# Patient Record
Sex: FEMALE | Race: WHITE | NOT HISPANIC OR LATINO | Employment: UNEMPLOYED | URBAN - METROPOLITAN AREA
[De-identification: names, ages, dates, MRNs, and addresses within clinical notes are randomized per-mention and may not be internally consistent; named-entity substitution may affect disease eponyms.]

---

## 2019-05-20 LAB — NONINV COLON CA DNA+OCC BLD SCRN STL QL: NORMAL

## 2019-05-24 LAB — HCV AB SER-ACNC: NEGATIVE

## 2020-07-06 LAB
CHOLEST SERPL-MSCNC: 209 MG/DL (ref 0–200)
HDLC SERPL-MCNC: 54 MG/DL
LDLC SERPL CALC-MCNC: 135 MG/DL (ref 0–160)
TRIGLYCERIDE (LIPID PAN): 102

## 2020-11-18 ENCOUNTER — HOSPITAL ENCOUNTER (OUTPATIENT)
Dept: RADIOLOGY | Facility: HOSPITAL | Age: 65
Discharge: HOME | End: 2020-11-18
Attending: FAMILY MEDICINE
Payer: MEDICARE

## 2020-11-18 DIAGNOSIS — R29.90 UNSPECIFIED SYMPTOMS AND SIGNS INVOLVING THE NERVOUS SYSTEM: ICD-10-CM

## 2020-11-18 PROCEDURE — 70553 MRI BRAIN W WO CONTRAST: ICD-10-PCS | Mod: 26,,, | Performed by: RADIOLOGY

## 2020-11-18 PROCEDURE — 70553 MRI BRAIN STEM W/O & W/DYE: CPT | Mod: 26,,, | Performed by: RADIOLOGY

## 2020-11-18 PROCEDURE — 70553 MRI BRAIN STEM W/O & W/DYE: CPT | Mod: TC

## 2020-11-18 PROCEDURE — 25500020 PHARM REV CODE 255: Performed by: FAMILY MEDICINE

## 2020-11-18 PROCEDURE — A9585 GADOBUTROL INJECTION: HCPCS | Performed by: FAMILY MEDICINE

## 2020-11-18 RX ORDER — GADOBUTROL 604.72 MG/ML
7 INJECTION INTRAVENOUS
Status: COMPLETED | OUTPATIENT
Start: 2020-11-18 | End: 2020-11-18

## 2020-11-18 RX ADMIN — GADOBUTROL 7 ML: 604.72 INJECTION INTRAVENOUS at 04:11

## 2021-02-09 ENCOUNTER — TELEPHONE (OUTPATIENT)
Dept: FAMILY MEDICINE | Facility: CLINIC | Age: 66
End: 2021-02-09

## 2021-02-20 ENCOUNTER — HOSPITAL ENCOUNTER (OUTPATIENT)
Dept: RADIOLOGY | Facility: HOSPITAL | Age: 66
Discharge: HOME | End: 2021-02-20
Attending: NEUROLOGICAL SURGERY
Payer: MEDICARE

## 2021-02-20 DIAGNOSIS — D33.2 BENIGN NEOPLASM OF BRAIN, UNSPECIFIED: ICD-10-CM

## 2021-02-20 LAB
CREAT SERPL-MCNC: 0.6 MG/DL (ref 0.5–1.4)
SAMPLE: NORMAL

## 2021-02-20 PROCEDURE — A9585 GADOBUTROL INJECTION: HCPCS | Performed by: NEUROLOGICAL SURGERY

## 2021-02-20 PROCEDURE — 70553 MRI BRAIN W WO CONTRAST: ICD-10-PCS | Mod: 26,,, | Performed by: RADIOLOGY

## 2021-02-20 PROCEDURE — 70553 MRI BRAIN STEM W/O & W/DYE: CPT | Mod: 26,,, | Performed by: RADIOLOGY

## 2021-02-20 PROCEDURE — 70553 MRI BRAIN STEM W/O & W/DYE: CPT | Mod: TC

## 2021-02-20 PROCEDURE — 25500020 PHARM REV CODE 255: Performed by: NEUROLOGICAL SURGERY

## 2021-02-20 RX ORDER — GADOBUTROL 604.72 MG/ML
7 INJECTION INTRAVENOUS
Status: COMPLETED | OUTPATIENT
Start: 2021-02-20 | End: 2021-02-20

## 2021-02-20 RX ADMIN — GADOBUTROL 7 ML: 604.72 INJECTION INTRAVENOUS at 10:02

## 2021-04-29 ENCOUNTER — OFFICE VISIT (OUTPATIENT)
Dept: FAMILY MEDICINE | Facility: CLINIC | Age: 66
End: 2021-04-29
Payer: MEDICARE

## 2021-04-29 ENCOUNTER — LAB VISIT (OUTPATIENT)
Dept: LAB | Facility: CLINIC | Age: 66
End: 2021-04-29
Payer: MEDICARE

## 2021-04-29 VITALS
BODY MASS INDEX: 30.33 KG/M2 | WEIGHT: 164.81 LBS | DIASTOLIC BLOOD PRESSURE: 80 MMHG | HEIGHT: 62 IN | OXYGEN SATURATION: 97 % | HEART RATE: 73 BPM | SYSTOLIC BLOOD PRESSURE: 120 MMHG | TEMPERATURE: 98 F

## 2021-04-29 DIAGNOSIS — I47.10 SUPRAVENTRICULAR TACHYCARDIA: ICD-10-CM

## 2021-04-29 DIAGNOSIS — G20.A1 PARKINSON DISEASE: Primary | ICD-10-CM

## 2021-04-29 DIAGNOSIS — G20.A1 PARKINSON DISEASE: ICD-10-CM

## 2021-04-29 DIAGNOSIS — R53.83 LETHARGY: ICD-10-CM

## 2021-04-29 DIAGNOSIS — C44.319 BASAL CELL CARCINOMA (BCC) OF SKIN OF OTHER PART OF FACE: ICD-10-CM

## 2021-04-29 LAB
ALBUMIN SERPL BCP-MCNC: 3.8 G/DL (ref 3.5–5.2)
ALP SERPL-CCNC: 90 U/L (ref 55–135)
ALT SERPL W/O P-5'-P-CCNC: <5 U/L (ref 10–44)
ANION GAP SERPL CALC-SCNC: 8 MMOL/L (ref 8–16)
AST SERPL-CCNC: 13 U/L (ref 10–40)
BASOPHILS # BLD AUTO: 0.04 K/UL (ref 0–0.2)
BASOPHILS NFR BLD: 0.5 % (ref 0–1.9)
BILIRUB SERPL-MCNC: 0.4 MG/DL (ref 0.1–1)
BUN SERPL-MCNC: 16 MG/DL (ref 8–23)
CALCIUM SERPL-MCNC: 9.2 MG/DL (ref 8.7–10.5)
CHLORIDE SERPL-SCNC: 103 MMOL/L (ref 95–110)
CO2 SERPL-SCNC: 29 MMOL/L (ref 23–29)
CREAT SERPL-MCNC: 0.8 MG/DL (ref 0.5–1.4)
DIFFERENTIAL METHOD: ABNORMAL
EOSINOPHIL # BLD AUTO: 0.1 K/UL (ref 0–0.5)
EOSINOPHIL NFR BLD: 0.6 % (ref 0–8)
ERYTHROCYTE [DISTWIDTH] IN BLOOD BY AUTOMATED COUNT: 12 % (ref 11.5–14.5)
EST. GFR  (AFRICAN AMERICAN): >60 ML/MIN/1.73 M^2
EST. GFR  (NON AFRICAN AMERICAN): >60 ML/MIN/1.73 M^2
GLUCOSE SERPL-MCNC: 99 MG/DL (ref 70–110)
HCT VFR BLD AUTO: 40.6 % (ref 37–48.5)
HGB BLD-MCNC: 13.2 G/DL (ref 12–16)
IMM GRANULOCYTES # BLD AUTO: 0.02 K/UL (ref 0–0.04)
IMM GRANULOCYTES NFR BLD AUTO: 0.3 % (ref 0–0.5)
LYMPHOCYTES # BLD AUTO: 2.2 K/UL (ref 1–4.8)
LYMPHOCYTES NFR BLD: 28 % (ref 18–48)
MCH RBC QN AUTO: 31.4 PG (ref 27–31)
MCHC RBC AUTO-ENTMCNC: 32.5 G/DL (ref 32–36)
MCV RBC AUTO: 96 FL (ref 82–98)
MONOCYTES # BLD AUTO: 0.5 K/UL (ref 0.3–1)
MONOCYTES NFR BLD: 6.4 % (ref 4–15)
NEUTROPHILS # BLD AUTO: 5 K/UL (ref 1.8–7.7)
NEUTROPHILS NFR BLD: 64.2 % (ref 38–73)
NRBC BLD-RTO: 0 /100 WBC
PLATELET # BLD AUTO: 299 K/UL (ref 150–450)
PMV BLD AUTO: 10.7 FL (ref 9.2–12.9)
POTASSIUM SERPL-SCNC: 4.7 MMOL/L (ref 3.5–5.1)
PROT SERPL-MCNC: 7.2 G/DL (ref 6–8.4)
RBC # BLD AUTO: 4.21 M/UL (ref 4–5.4)
SODIUM SERPL-SCNC: 140 MMOL/L (ref 136–145)
TSH SERPL DL<=0.005 MIU/L-ACNC: 1.12 UIU/ML (ref 0.4–4)
WBC # BLD AUTO: 7.83 K/UL (ref 3.9–12.7)

## 2021-04-29 PROCEDURE — 36415 COLL VENOUS BLD VENIPUNCTURE: CPT | Mod: ,,, | Performed by: FAMILY MEDICINE

## 2021-04-29 PROCEDURE — 85025 COMPLETE CBC W/AUTO DIFF WBC: CPT | Performed by: FAMILY MEDICINE

## 2021-04-29 PROCEDURE — 99213 OFFICE O/P EST LOW 20 MIN: CPT | Mod: S$GLB,,, | Performed by: FAMILY MEDICINE

## 2021-04-29 PROCEDURE — 3008F PR BODY MASS INDEX (BMI) DOCUMENTED: ICD-10-PCS | Mod: CPTII,S$GLB,, | Performed by: FAMILY MEDICINE

## 2021-04-29 PROCEDURE — 36415 PR COLLECTION VENOUS BLOOD,VENIPUNCTURE: ICD-10-PCS | Mod: ,,, | Performed by: FAMILY MEDICINE

## 2021-04-29 PROCEDURE — 3288F PR FALLS RISK ASSESSMENT DOCUMENTED: ICD-10-PCS | Mod: CPTII,S$GLB,, | Performed by: FAMILY MEDICINE

## 2021-04-29 PROCEDURE — 84443 ASSAY THYROID STIM HORMONE: CPT | Performed by: FAMILY MEDICINE

## 2021-04-29 PROCEDURE — 3288F FALL RISK ASSESSMENT DOCD: CPT | Mod: CPTII,S$GLB,, | Performed by: FAMILY MEDICINE

## 2021-04-29 PROCEDURE — 80053 COMPREHEN METABOLIC PANEL: CPT | Performed by: FAMILY MEDICINE

## 2021-04-29 PROCEDURE — 1159F MED LIST DOCD IN RCRD: CPT | Mod: S$GLB,,, | Performed by: FAMILY MEDICINE

## 2021-04-29 PROCEDURE — 1159F PR MEDICATION LIST DOCUMENTED IN MEDICAL RECORD: ICD-10-PCS | Mod: S$GLB,,, | Performed by: FAMILY MEDICINE

## 2021-04-29 PROCEDURE — 1101F PR PT FALLS ASSESS DOC 0-1 FALLS W/OUT INJ PAST YR: ICD-10-PCS | Mod: CPTII,S$GLB,, | Performed by: FAMILY MEDICINE

## 2021-04-29 PROCEDURE — 1101F PT FALLS ASSESS-DOCD LE1/YR: CPT | Mod: CPTII,S$GLB,, | Performed by: FAMILY MEDICINE

## 2021-04-29 PROCEDURE — 99213 PR OFFICE/OUTPT VISIT, EST, LEVL III, 20-29 MIN: ICD-10-PCS | Mod: S$GLB,,, | Performed by: FAMILY MEDICINE

## 2021-04-29 PROCEDURE — 3008F BODY MASS INDEX DOCD: CPT | Mod: CPTII,S$GLB,, | Performed by: FAMILY MEDICINE

## 2021-04-29 RX ORDER — IMIQUIMOD 12.5 MG/.25G
CREAM TOPICAL
Qty: 12 PACKET | Refills: 0 | Status: SHIPPED | OUTPATIENT
Start: 2021-04-29 | End: 2022-03-31

## 2021-04-29 RX ORDER — ACETAMINOPHEN, DIPHENHYDRAMINE HCL, PHENYLEPHRINE HCL 325; 25; 5 MG/1; MG/1; MG/1
TABLET ORAL
COMMUNITY

## 2021-04-29 RX ORDER — CARBIDOPA AND LEVODOPA 25; 100 MG/1; MG/1
TABLET ORAL
COMMUNITY
Start: 2021-03-25 | End: 2022-01-04

## 2021-05-05 DIAGNOSIS — Z12.11 COLON CANCER SCREENING: ICD-10-CM

## 2021-10-07 ENCOUNTER — PATIENT MESSAGE (OUTPATIENT)
Dept: ADMINISTRATIVE | Facility: HOSPITAL | Age: 66
End: 2021-10-07

## 2021-10-08 ENCOUNTER — CLINICAL SUPPORT (OUTPATIENT)
Dept: FAMILY MEDICINE | Facility: CLINIC | Age: 66
End: 2021-10-08
Payer: MEDICARE

## 2021-10-08 DIAGNOSIS — Z23 FLU VACCINE NEED: Primary | ICD-10-CM

## 2021-10-08 PROCEDURE — G0008 ADMIN INFLUENZA VIRUS VAC: HCPCS | Mod: S$GLB,,, | Performed by: FAMILY MEDICINE

## 2021-10-08 PROCEDURE — 90694 FLU VACCINE - QUADRIVALENT - ADJUVANTED: ICD-10-PCS | Mod: S$GLB,,, | Performed by: FAMILY MEDICINE

## 2021-10-08 PROCEDURE — G0008 FLU VACCINE - QUADRIVALENT - ADJUVANTED: ICD-10-PCS | Mod: S$GLB,,, | Performed by: FAMILY MEDICINE

## 2021-10-08 PROCEDURE — 90694 VACC AIIV4 NO PRSRV 0.5ML IM: CPT | Mod: S$GLB,,, | Performed by: FAMILY MEDICINE

## 2021-10-12 ENCOUNTER — TELEPHONE (OUTPATIENT)
Dept: FAMILY MEDICINE | Facility: CLINIC | Age: 66
End: 2021-10-12

## 2021-10-12 DIAGNOSIS — G20.A1 PARKINSONS: Primary | ICD-10-CM

## 2021-10-27 DIAGNOSIS — Z12.31 OTHER SCREENING MAMMOGRAM: ICD-10-CM

## 2021-11-17 ENCOUNTER — PATIENT OUTREACH (OUTPATIENT)
Dept: ADMINISTRATIVE | Facility: HOSPITAL | Age: 66
End: 2021-11-17
Payer: COMMERCIAL

## 2021-12-08 ENCOUNTER — PATIENT OUTREACH (OUTPATIENT)
Dept: ADMINISTRATIVE | Facility: HOSPITAL | Age: 66
End: 2021-12-08
Payer: COMMERCIAL

## 2021-12-09 ENCOUNTER — TELEPHONE (OUTPATIENT)
Dept: FAMILY MEDICINE | Facility: CLINIC | Age: 66
End: 2021-12-09
Payer: COMMERCIAL

## 2021-12-13 ENCOUNTER — TELEPHONE (OUTPATIENT)
Dept: FAMILY MEDICINE | Facility: CLINIC | Age: 66
End: 2021-12-13
Payer: COMMERCIAL

## 2022-01-04 ENCOUNTER — OFFICE VISIT (OUTPATIENT)
Dept: FAMILY MEDICINE | Facility: CLINIC | Age: 67
End: 2022-01-04
Payer: MEDICARE

## 2022-01-04 VITALS
OXYGEN SATURATION: 99 % | HEART RATE: 82 BPM | TEMPERATURE: 98 F | BODY MASS INDEX: 29.63 KG/M2 | SYSTOLIC BLOOD PRESSURE: 136 MMHG | WEIGHT: 162 LBS | DIASTOLIC BLOOD PRESSURE: 70 MMHG

## 2022-01-04 DIAGNOSIS — G20.A1 PARKINSON DISEASE: Primary | ICD-10-CM

## 2022-01-04 DIAGNOSIS — E78.2 MIXED HYPERLIPIDEMIA: ICD-10-CM

## 2022-01-04 PROCEDURE — 1160F RVW MEDS BY RX/DR IN RCRD: CPT | Mod: CPTII,S$GLB,, | Performed by: FAMILY MEDICINE

## 2022-01-04 PROCEDURE — 1101F PT FALLS ASSESS-DOCD LE1/YR: CPT | Mod: CPTII,S$GLB,, | Performed by: FAMILY MEDICINE

## 2022-01-04 PROCEDURE — 3008F PR BODY MASS INDEX (BMI) DOCUMENTED: ICD-10-PCS | Mod: CPTII,S$GLB,, | Performed by: FAMILY MEDICINE

## 2022-01-04 PROCEDURE — 99213 OFFICE O/P EST LOW 20 MIN: CPT | Mod: S$GLB,,, | Performed by: FAMILY MEDICINE

## 2022-01-04 PROCEDURE — 1159F PR MEDICATION LIST DOCUMENTED IN MEDICAL RECORD: ICD-10-PCS | Mod: CPTII,S$GLB,, | Performed by: FAMILY MEDICINE

## 2022-01-04 PROCEDURE — 99213 PR OFFICE/OUTPT VISIT, EST, LEVL III, 20-29 MIN: ICD-10-PCS | Mod: S$GLB,,, | Performed by: FAMILY MEDICINE

## 2022-01-04 PROCEDURE — 3288F FALL RISK ASSESSMENT DOCD: CPT | Mod: CPTII,S$GLB,, | Performed by: FAMILY MEDICINE

## 2022-01-04 PROCEDURE — 1159F MED LIST DOCD IN RCRD: CPT | Mod: CPTII,S$GLB,, | Performed by: FAMILY MEDICINE

## 2022-01-04 PROCEDURE — 3288F PR FALLS RISK ASSESSMENT DOCUMENTED: ICD-10-PCS | Mod: CPTII,S$GLB,, | Performed by: FAMILY MEDICINE

## 2022-01-04 PROCEDURE — 3078F PR MOST RECENT DIASTOLIC BLOOD PRESSURE < 80 MM HG: ICD-10-PCS | Mod: CPTII,S$GLB,, | Performed by: FAMILY MEDICINE

## 2022-01-04 PROCEDURE — 99999 PR PBB SHADOW E&M-EST. PATIENT-LVL III: ICD-10-PCS | Mod: PBBFAC,,, | Performed by: FAMILY MEDICINE

## 2022-01-04 PROCEDURE — 3075F PR MOST RECENT SYSTOLIC BLOOD PRESS GE 130-139MM HG: ICD-10-PCS | Mod: CPTII,S$GLB,, | Performed by: FAMILY MEDICINE

## 2022-01-04 PROCEDURE — 99999 PR PBB SHADOW E&M-EST. PATIENT-LVL III: CPT | Mod: PBBFAC,,, | Performed by: FAMILY MEDICINE

## 2022-01-04 PROCEDURE — 1160F PR REVIEW ALL MEDS BY PRESCRIBER/CLIN PHARMACIST DOCUMENTED: ICD-10-PCS | Mod: CPTII,S$GLB,, | Performed by: FAMILY MEDICINE

## 2022-01-04 PROCEDURE — 1101F PR PT FALLS ASSESS DOC 0-1 FALLS W/OUT INJ PAST YR: ICD-10-PCS | Mod: CPTII,S$GLB,, | Performed by: FAMILY MEDICINE

## 2022-01-04 PROCEDURE — 3078F DIAST BP <80 MM HG: CPT | Mod: CPTII,S$GLB,, | Performed by: FAMILY MEDICINE

## 2022-01-04 PROCEDURE — 3075F SYST BP GE 130 - 139MM HG: CPT | Mod: CPTII,S$GLB,, | Performed by: FAMILY MEDICINE

## 2022-01-04 PROCEDURE — 3008F BODY MASS INDEX DOCD: CPT | Mod: CPTII,S$GLB,, | Performed by: FAMILY MEDICINE

## 2022-01-04 RX ORDER — ROTIGOTINE 6 MG/24H
1 PATCH, EXTENDED RELEASE TRANSDERMAL
COMMUNITY
Start: 2021-11-29 | End: 2022-01-07

## 2022-01-04 NOTE — PROGRESS NOTES
SUBJECTIVE:    Patient ID: Corinna Rene is a 66 y.o. female.    Chief Complaint: Follow-up (Pt present today for routine f/u/Pt does not present with any concerns at todays visit/Pt states she does not have any new allergies or surgeries to report since last visit with Dr. Broussard/Pt states she has not seen any other specialists, ER trips, or Hospital stays since her last visit with Dr. Broussard/Pt states the only medication changes to report is that Dr. Nash her neurologist at [facility] prescribes her Neupro (rotigotine transdermal patch) for her parkinsons disease and she is currently taki)    S/p COVID Vaccine x 2   No response to Neupro yet     Mammogram - stable   Repeat One year     Needs f/u MRI for stability check  Will let Dr Nash manage.            Patient Outreach on 11/17/2021   Component Date Value Ref Range Status    Cholesterol 07/06/2020 209* 0 - 200 mg/dL Final    TRIGLYCERIDE (LIPID PAN) 07/06/2020 102   Final    HDL 07/06/2020 54  mg/dL Final    LDL Calculated 07/06/2020 135  0 - 160 MG/DL Final    Hepatitis C Ab 05/24/2019 Negative   Final       History reviewed. No pertinent past medical history.  Social History     Socioeconomic History    Marital status:      History reviewed. No pertinent surgical history.  History reviewed. No pertinent family history.    Review of patient's allergies indicates:   Allergen Reactions    Codeine Nausea And Vomiting       Current Outpatient Medications:     rotigotine (NEUPRO) 6 mg/24 hour, Place 1 patch onto the skin., Disp: , Rfl:     imiquimod (ALDARA) 5 % cream, Apply 5 x a week x 6 weeks, Disp: 12 packet, Rfl: 0    melatonin 10 mg Tab, melatonin  1 hs, Disp: , Rfl:     Review of Systems   Constitutional: Negative for activity change, appetite change, diaphoresis, fatigue, fever and unexpected weight change.   HENT: Positive for trouble swallowing (chocks easily). Negative for nasal congestion, hearing loss, rhinorrhea, sinus  pressure/congestion, sneezing, sore throat, tinnitus and voice change.    Eyes: Negative for photophobia, pain, discharge, itching and visual disturbance.   Respiratory: Negative for cough, choking, chest tightness, shortness of breath and wheezing.    Cardiovascular: Negative for chest pain, palpitations, leg swelling and claudication.   Gastrointestinal: Negative for abdominal pain, blood in stool, constipation, diarrhea, nausea, vomiting and reflux.   Endocrine: Negative for polydipsia and polyuria.   Genitourinary: Negative for bladder incontinence, difficulty urinating, dysuria, frequency, hematuria, menstrual irregularity, menstrual problem, nocturia, pelvic pain, urgency, vaginal bleeding, vaginal discharge and vaginal pain.   Musculoskeletal: Negative for arthralgias, back pain, gait problem, joint swelling, leg pain, myalgias and neck pain.   Integumentary:  Negative for color change, rash, mole/lesion, breast discharge and breast tenderness.   Neurological: Negative for dizziness, tremors, seizures, syncope, speech difficulty, headaches, disturbances in coordination, memory loss and coordination difficulties.   Hematological: Negative for adenopathy. Does not bruise/bleed easily.   Psychiatric/Behavioral: Negative for confusion, decreased concentration and sleep disturbance.   Breast: Negative for tenderness          Objective:      Vitals:    01/04/22 1503   BP: 136/70   Pulse: 82   Temp: 97.9 °F (36.6 °C)   TempSrc: Oral   SpO2: 99%   Weight: 73.5 kg (162 lb)     Physical Exam  Vitals and nursing note reviewed.   Neck:      Vascular: No carotid bruit.   Cardiovascular:      Rate and Rhythm: Normal rate and regular rhythm.      Heart sounds: Normal heart sounds.   Pulmonary:      Effort: No respiratory distress.      Breath sounds: Normal breath sounds.   Musculoskeletal:      Right lower leg: No edema.      Left lower leg: No edema.   Skin:     General: Skin is dry.      Comments: Possible basal cell ca  on face has resolved with treatment    Neurological:      Mental Status: She is alert.           Assessment:       1. Parkinson disease    2. Mixed hyperlipidemia         Plan:       Parkinson disease  -     Comprehensive Metabolic Panel; Future; Expected date: 04/15/2022    Mixed hyperlipidemia  -     Lipid Panel; Future; Expected date: 04/15/2022      Follow up in about 3 months (around 4/4/2022).        1/4/2022 Travon Broussard M.D.

## 2022-01-07 DIAGNOSIS — G20.A1 PARKINSON DISEASE: Primary | ICD-10-CM

## 2022-02-09 ENCOUNTER — TELEPHONE (OUTPATIENT)
Dept: FAMILY MEDICINE | Facility: CLINIC | Age: 67
End: 2022-02-09
Payer: MEDICARE

## 2022-02-09 NOTE — TELEPHONE ENCOUNTER
----- Message from Briseyda Mcnally sent at 2/9/2022 11:07 AM CST -----  Type: Needs Medical Advice  Who Called:  Pt  Best Call Back Number: [phone]  Additional Information: Pt requesting call back from nurse about scheduling--please advise--thank you

## 2022-03-21 ENCOUNTER — TELEPHONE (OUTPATIENT)
Dept: FAMILY MEDICINE | Facility: CLINIC | Age: 67
End: 2022-03-21
Payer: MEDICARE

## 2022-03-21 NOTE — TELEPHONE ENCOUNTER
----- Message from Michaelle Melgar sent at 3/21/2022 10:51 AM CDT -----  Type:  Sooner Apoointment Request    Caller is requesting a sooner appointment.  Caller declined first available appointment listed below.  Caller will not accept being placed on the waitlist and is requesting a message be sent to doctor.    Name of Caller:  pt  When is the first available appointment?    Symptoms:  check up on cholesterol   Best Call Back Number:  [phone] (home)     Additional Information:

## 2022-03-29 ENCOUNTER — LAB VISIT (OUTPATIENT)
Dept: LAB | Facility: CLINIC | Age: 67
End: 2022-03-29
Payer: MEDICARE

## 2022-03-29 DIAGNOSIS — E78.2 MIXED HYPERLIPIDEMIA: ICD-10-CM

## 2022-03-29 DIAGNOSIS — G20.A1 PARKINSON DISEASE: ICD-10-CM

## 2022-03-29 LAB
ALBUMIN SERPL BCP-MCNC: 3.7 G/DL (ref 3.5–5.2)
ALP SERPL-CCNC: 85 U/L (ref 55–135)
ALT SERPL W/O P-5'-P-CCNC: 16 U/L (ref 10–44)
ANION GAP SERPL CALC-SCNC: 8 MMOL/L (ref 8–16)
AST SERPL-CCNC: 18 U/L (ref 10–40)
BILIRUB SERPL-MCNC: 0.5 MG/DL (ref 0.1–1)
BUN SERPL-MCNC: 17 MG/DL (ref 8–23)
CALCIUM SERPL-MCNC: 9 MG/DL (ref 8.7–10.5)
CHLORIDE SERPL-SCNC: 105 MMOL/L (ref 95–110)
CHOLEST SERPL-MCNC: 196 MG/DL (ref 120–199)
CHOLEST/HDLC SERPL: 3.4 {RATIO} (ref 2–5)
CO2 SERPL-SCNC: 26 MMOL/L (ref 23–29)
CREAT SERPL-MCNC: 0.7 MG/DL (ref 0.5–1.4)
EST. GFR  (AFRICAN AMERICAN): >60 ML/MIN/1.73 M^2
EST. GFR  (NON AFRICAN AMERICAN): >60 ML/MIN/1.73 M^2
GLUCOSE SERPL-MCNC: 96 MG/DL (ref 70–110)
HDLC SERPL-MCNC: 57 MG/DL (ref 40–75)
HDLC SERPL: 29.1 % (ref 20–50)
LDLC SERPL CALC-MCNC: 123.2 MG/DL (ref 63–159)
NONHDLC SERPL-MCNC: 139 MG/DL
POTASSIUM SERPL-SCNC: 4 MMOL/L (ref 3.5–5.1)
PROT SERPL-MCNC: 7.1 G/DL (ref 6–8.4)
SODIUM SERPL-SCNC: 139 MMOL/L (ref 136–145)
TRIGL SERPL-MCNC: 79 MG/DL (ref 30–150)

## 2022-03-29 PROCEDURE — 36415 COLL VENOUS BLD VENIPUNCTURE: CPT | Mod: PN,,, | Performed by: FAMILY MEDICINE

## 2022-03-29 PROCEDURE — 80061 LIPID PANEL: CPT | Performed by: FAMILY MEDICINE

## 2022-03-29 PROCEDURE — 80053 COMPREHEN METABOLIC PANEL: CPT | Performed by: FAMILY MEDICINE

## 2022-03-29 PROCEDURE — 36415 PR COLLECTION VENOUS BLOOD,VENIPUNCTURE: ICD-10-PCS | Mod: PN,,, | Performed by: FAMILY MEDICINE

## 2022-03-31 ENCOUNTER — OFFICE VISIT (OUTPATIENT)
Dept: FAMILY MEDICINE | Facility: CLINIC | Age: 67
End: 2022-03-31
Payer: MEDICARE

## 2022-03-31 VITALS
DIASTOLIC BLOOD PRESSURE: 80 MMHG | SYSTOLIC BLOOD PRESSURE: 124 MMHG | BODY MASS INDEX: 30 KG/M2 | OXYGEN SATURATION: 99 % | HEIGHT: 62 IN | TEMPERATURE: 99 F | WEIGHT: 163 LBS | HEART RATE: 73 BPM

## 2022-03-31 DIAGNOSIS — G20.A1 PARKINSON DISEASE: Primary | ICD-10-CM

## 2022-03-31 PROCEDURE — 99999 PR PBB SHADOW E&M-EST. PATIENT-LVL III: ICD-10-PCS | Mod: PBBFAC,,, | Performed by: FAMILY MEDICINE

## 2022-03-31 PROCEDURE — 3008F BODY MASS INDEX DOCD: CPT | Mod: CPTII,S$GLB,, | Performed by: FAMILY MEDICINE

## 2022-03-31 PROCEDURE — 3008F PR BODY MASS INDEX (BMI) DOCUMENTED: ICD-10-PCS | Mod: CPTII,S$GLB,, | Performed by: FAMILY MEDICINE

## 2022-03-31 PROCEDURE — 99213 OFFICE O/P EST LOW 20 MIN: CPT | Mod: S$GLB,,, | Performed by: FAMILY MEDICINE

## 2022-03-31 PROCEDURE — 3074F PR MOST RECENT SYSTOLIC BLOOD PRESSURE < 130 MM HG: ICD-10-PCS | Mod: CPTII,S$GLB,, | Performed by: FAMILY MEDICINE

## 2022-03-31 PROCEDURE — 1126F PR PAIN SEVERITY QUANTIFIED, NO PAIN PRESENT: ICD-10-PCS | Mod: CPTII,S$GLB,, | Performed by: FAMILY MEDICINE

## 2022-03-31 PROCEDURE — 3079F DIAST BP 80-89 MM HG: CPT | Mod: CPTII,S$GLB,, | Performed by: FAMILY MEDICINE

## 2022-03-31 PROCEDURE — 3079F PR MOST RECENT DIASTOLIC BLOOD PRESSURE 80-89 MM HG: ICD-10-PCS | Mod: CPTII,S$GLB,, | Performed by: FAMILY MEDICINE

## 2022-03-31 PROCEDURE — 99999 PR PBB SHADOW E&M-EST. PATIENT-LVL III: CPT | Mod: PBBFAC,,, | Performed by: FAMILY MEDICINE

## 2022-03-31 PROCEDURE — 1126F AMNT PAIN NOTED NONE PRSNT: CPT | Mod: CPTII,S$GLB,, | Performed by: FAMILY MEDICINE

## 2022-03-31 PROCEDURE — 99213 PR OFFICE/OUTPT VISIT, EST, LEVL III, 20-29 MIN: ICD-10-PCS | Mod: S$GLB,,, | Performed by: FAMILY MEDICINE

## 2022-03-31 PROCEDURE — 3074F SYST BP LT 130 MM HG: CPT | Mod: CPTII,S$GLB,, | Performed by: FAMILY MEDICINE

## 2022-03-31 RX ORDER — CARBIDOPA AND LEVODOPA 10; 100 MG/1; MG/1
1 TABLET ORAL 3 TIMES DAILY
Qty: 90 TABLET | Refills: 11 | Status: SHIPPED | OUTPATIENT
Start: 2022-03-31 | End: 2022-03-31

## 2022-03-31 NOTE — PROGRESS NOTES
SUBJECTIVE:    Patient ID: Corinna Rene is a 67 y.o. female.    Chief Complaint: Follow-up (Parkinsons check up/Pt had to d/c Neupro patches,,,too expensive ,needs new rx sinemet/See labs)    Follow-up on this 67-year-old female with Parkinson's disease.  She recently had lab  Her CMP was within normal limits.  Her lipid profile showed all elements at are below recommended targets.    She cannot of for her Neupro patches.  She has restarted her Sinemet 10 100.    She does state that her swallowing is improved.        Lab Visit on 03/29/2022   Component Date Value Ref Range Status    Cholesterol 03/29/2022 196  120 - 199 mg/dL Final    Triglycerides 03/29/2022 79  30 - 150 mg/dL Final    HDL 03/29/2022 57  40 - 75 mg/dL Final    LDL Cholesterol 03/29/2022 123.2  63.0 - 159.0 mg/dL Final    HDL/Cholesterol Ratio 03/29/2022 29.1  20.0 - 50.0 % Final    Total Cholesterol/HDL Ratio 03/29/2022 3.4  2.0 - 5.0 Final    Non-HDL Cholesterol 03/29/2022 139  mg/dL Final    Sodium 03/29/2022 139  136 - 145 mmol/L Final    Potassium 03/29/2022 4.0  3.5 - 5.1 mmol/L Final    Chloride 03/29/2022 105  95 - 110 mmol/L Final    CO2 03/29/2022 26  23 - 29 mmol/L Final    Glucose 03/29/2022 96  70 - 110 mg/dL Final    BUN 03/29/2022 17  8 - 23 mg/dL Final    Creatinine 03/29/2022 0.7  0.5 - 1.4 mg/dL Final    Calcium 03/29/2022 9.0  8.7 - 10.5 mg/dL Final    Total Protein 03/29/2022 7.1  6.0 - 8.4 g/dL Final    Albumin 03/29/2022 3.7  3.5 - 5.2 g/dL Final    Total Bilirubin 03/29/2022 0.5  0.1 - 1.0 mg/dL Final    Alkaline Phosphatase 03/29/2022 85  55 - 135 U/L Final    AST 03/29/2022 18  10 - 40 U/L Final    ALT 03/29/2022 16  10 - 44 U/L Final    Anion Gap 03/29/2022 8  8 - 16 mmol/L Final    eGFR if African American 03/29/2022 >60  >60 mL/min/1.73 m^2 Final    eGFR if non African American 03/29/2022 >60  >60 mL/min/1.73 m^2 Final   Patient Outreach on 11/17/2021   Component Date Value Ref Range Status     Cholesterol 07/06/2020 209 (A) 0 - 200 mg/dL Final    TRIGLYCERIDE (LIPID PAN) 07/06/2020 102   Final    HDL 07/06/2020 54  mg/dL Final    LDL Calculated 07/06/2020 135  0 - 160 MG/DL Final    Hepatitis C Ab 05/24/2019 Negative   Final       No past medical history on file.  Social History     Socioeconomic History    Marital status:      No past surgical history on file.  No family history on file.    Review of patient's allergies indicates:   Allergen Reactions    Codeine Nausea And Vomiting       Current Outpatient Medications:     melatonin 10 mg Tab, melatonin  1 hs, Disp: , Rfl:     Review of Systems   Constitutional: Negative for activity change, appetite change, diaphoresis, fatigue, fever and unexpected weight change.   HENT: Positive for trouble swallowing (improved). Negative for nasal congestion, hearing loss, rhinorrhea, sinus pressure/congestion, sneezing, sore throat, tinnitus and voice change.    Eyes: Negative for photophobia, pain, discharge, itching and visual disturbance.   Respiratory: Negative for cough, choking, chest tightness, shortness of breath and wheezing.    Cardiovascular: Negative for chest pain, palpitations, leg swelling and claudication.   Gastrointestinal: Negative for abdominal pain, blood in stool, constipation, diarrhea, nausea, vomiting and reflux.   Endocrine: Negative for polydipsia and polyuria.   Genitourinary: Negative for bladder incontinence, difficulty urinating, dysuria, frequency, hematuria, menstrual irregularity, menstrual problem, nocturia, pelvic pain, urgency, vaginal bleeding, vaginal discharge and vaginal pain.   Musculoskeletal: Negative for arthralgias, back pain, gait problem, joint swelling, leg pain, myalgias and neck pain.   Integumentary:  Negative for color change, rash, mole/lesion, breast discharge and breast tenderness.   Neurological: Negative for dizziness, tremors, seizures, syncope, speech difficulty, headaches, disturbances in  "coordination, memory loss and coordination difficulties.   Hematological: Negative for adenopathy. Does not bruise/bleed easily.   Psychiatric/Behavioral: Negative for confusion, decreased concentration and sleep disturbance.   Breast: Negative for tenderness          Objective:      Vitals:    03/31/22 1107   BP: 124/80   Pulse: 73   Temp: 98.5 °F (36.9 °C)   SpO2: 99%   Weight: 73.9 kg (163 lb)   Height: 5' 2" (1.575 m)     Physical Exam  Vitals and nursing note reviewed.   Neck:      Vascular: No carotid bruit.   Cardiovascular:      Rate and Rhythm: Normal rate and regular rhythm.      Heart sounds: Normal heart sounds.   Pulmonary:      Effort: No respiratory distress.      Breath sounds: Normal breath sounds.   Musculoskeletal:      Right lower leg: No edema.      Left lower leg: No edema.   Skin:     General: Skin is dry.   Neurological:      Mental Status: She is alert.          Assessment:       1. Parkinson disease         Plan:       Parkinson disease    Other orders  -     Discontinue: carbidopa-levodopa  mg (SINEMET)  mg per tablet; Take 1 tablet by mouth 3 (three) times daily.  Dispense: 90 tablet; Refill: 11      Follow up in about 4 weeks (around 4/28/2022).        3/31/2022 Travon Broussard M.D.      "

## 2022-04-26 ENCOUNTER — OFFICE VISIT (OUTPATIENT)
Dept: FAMILY MEDICINE | Facility: CLINIC | Age: 67
End: 2022-04-26
Payer: MEDICARE

## 2022-04-26 VITALS
HEIGHT: 62 IN | OXYGEN SATURATION: 97 % | SYSTOLIC BLOOD PRESSURE: 110 MMHG | WEIGHT: 158 LBS | BODY MASS INDEX: 29.08 KG/M2 | DIASTOLIC BLOOD PRESSURE: 70 MMHG | HEART RATE: 77 BPM | TEMPERATURE: 99 F

## 2022-04-26 DIAGNOSIS — R05.9 COUGH: ICD-10-CM

## 2022-04-26 DIAGNOSIS — G20.A1 PARKINSON DISEASE: Primary | ICD-10-CM

## 2022-04-26 PROCEDURE — 99999 PR PBB SHADOW E&M-EST. PATIENT-LVL III: CPT | Mod: PBBFAC,,, | Performed by: FAMILY MEDICINE

## 2022-04-26 PROCEDURE — 3008F PR BODY MASS INDEX (BMI) DOCUMENTED: ICD-10-PCS | Mod: CPTII,S$GLB,, | Performed by: FAMILY MEDICINE

## 2022-04-26 PROCEDURE — 3008F BODY MASS INDEX DOCD: CPT | Mod: CPTII,S$GLB,, | Performed by: FAMILY MEDICINE

## 2022-04-26 PROCEDURE — 3074F SYST BP LT 130 MM HG: CPT | Mod: CPTII,S$GLB,, | Performed by: FAMILY MEDICINE

## 2022-04-26 PROCEDURE — 3074F PR MOST RECENT SYSTOLIC BLOOD PRESSURE < 130 MM HG: ICD-10-PCS | Mod: CPTII,S$GLB,, | Performed by: FAMILY MEDICINE

## 2022-04-26 PROCEDURE — 1101F PR PT FALLS ASSESS DOC 0-1 FALLS W/OUT INJ PAST YR: ICD-10-PCS | Mod: CPTII,S$GLB,, | Performed by: FAMILY MEDICINE

## 2022-04-26 PROCEDURE — 1159F PR MEDICATION LIST DOCUMENTED IN MEDICAL RECORD: ICD-10-PCS | Mod: CPTII,S$GLB,, | Performed by: FAMILY MEDICINE

## 2022-04-26 PROCEDURE — 1159F MED LIST DOCD IN RCRD: CPT | Mod: CPTII,S$GLB,, | Performed by: FAMILY MEDICINE

## 2022-04-26 PROCEDURE — 3288F PR FALLS RISK ASSESSMENT DOCUMENTED: ICD-10-PCS | Mod: CPTII,S$GLB,, | Performed by: FAMILY MEDICINE

## 2022-04-26 PROCEDURE — 99213 OFFICE O/P EST LOW 20 MIN: CPT | Mod: S$GLB,,, | Performed by: FAMILY MEDICINE

## 2022-04-26 PROCEDURE — 1126F AMNT PAIN NOTED NONE PRSNT: CPT | Mod: CPTII,S$GLB,, | Performed by: FAMILY MEDICINE

## 2022-04-26 PROCEDURE — 3288F FALL RISK ASSESSMENT DOCD: CPT | Mod: CPTII,S$GLB,, | Performed by: FAMILY MEDICINE

## 2022-04-26 PROCEDURE — 99213 PR OFFICE/OUTPT VISIT, EST, LEVL III, 20-29 MIN: ICD-10-PCS | Mod: S$GLB,,, | Performed by: FAMILY MEDICINE

## 2022-04-26 PROCEDURE — 3078F PR MOST RECENT DIASTOLIC BLOOD PRESSURE < 80 MM HG: ICD-10-PCS | Mod: CPTII,S$GLB,, | Performed by: FAMILY MEDICINE

## 2022-04-26 PROCEDURE — 99999 PR PBB SHADOW E&M-EST. PATIENT-LVL III: ICD-10-PCS | Mod: PBBFAC,,, | Performed by: FAMILY MEDICINE

## 2022-04-26 PROCEDURE — 1101F PT FALLS ASSESS-DOCD LE1/YR: CPT | Mod: CPTII,S$GLB,, | Performed by: FAMILY MEDICINE

## 2022-04-26 PROCEDURE — 1126F PR PAIN SEVERITY QUANTIFIED, NO PAIN PRESENT: ICD-10-PCS | Mod: CPTII,S$GLB,, | Performed by: FAMILY MEDICINE

## 2022-04-26 PROCEDURE — 3078F DIAST BP <80 MM HG: CPT | Mod: CPTII,S$GLB,, | Performed by: FAMILY MEDICINE

## 2022-04-26 RX ORDER — CARBIDOPA AND LEVODOPA 10; 100 MG/1; MG/1
1 TABLET ORAL 3 TIMES DAILY
COMMUNITY
End: 2022-09-08 | Stop reason: SDUPTHER

## 2022-04-26 RX ORDER — GUAIFENESIN AND DEXTROMETHORPHAN HYDROBROMIDE 600; 30 MG/1; MG/1
1 TABLET, EXTENDED RELEASE ORAL 2 TIMES DAILY
Qty: 20 TABLET | Refills: 99
Start: 2022-04-26 | End: 2022-05-06

## 2022-04-26 RX ORDER — NADOLOL 20 MG/1
20 TABLET ORAL DAILY
Qty: 30 TABLET | Refills: 11 | Status: SHIPPED | OUTPATIENT
Start: 2022-04-26 | End: 2022-06-07

## 2022-04-26 NOTE — PATIENT INSTRUCTIONS
Add new medicine to regimen once a day.  Take Mucinex DM twice a day with liquids for cough.    If develop purulence productive cough or fever call office.

## 2022-04-26 NOTE — PROGRESS NOTES
SUBJECTIVE:    Patient ID: Corinna Rene is a 67 y.o. female.    Chief Complaint: Follow-up (C/o allergies,cough prod,clear x1week/Taking robitussin/Pt has restarted sinemet and it's helping with tremors)    Follow-up on this 67-year-old female with Parkinson's disease.  She went back to Sinemet due to cost.  She is currently taking  1/2 in the morning and  1 in the evening  She has been prescribed a beta-blocker in the past which she felt did help her tremor.          Lab Visit on 03/29/2022   Component Date Value Ref Range Status    Cholesterol 03/29/2022 196  120 - 199 mg/dL Final    Triglycerides 03/29/2022 79  30 - 150 mg/dL Final    HDL 03/29/2022 57  40 - 75 mg/dL Final    LDL Cholesterol 03/29/2022 123.2  63.0 - 159.0 mg/dL Final    HDL/Cholesterol Ratio 03/29/2022 29.1  20.0 - 50.0 % Final    Total Cholesterol/HDL Ratio 03/29/2022 3.4  2.0 - 5.0 Final    Non-HDL Cholesterol 03/29/2022 139  mg/dL Final    Sodium 03/29/2022 139  136 - 145 mmol/L Final    Potassium 03/29/2022 4.0  3.5 - 5.1 mmol/L Final    Chloride 03/29/2022 105  95 - 110 mmol/L Final    CO2 03/29/2022 26  23 - 29 mmol/L Final    Glucose 03/29/2022 96  70 - 110 mg/dL Final    BUN 03/29/2022 17  8 - 23 mg/dL Final    Creatinine 03/29/2022 0.7  0.5 - 1.4 mg/dL Final    Calcium 03/29/2022 9.0  8.7 - 10.5 mg/dL Final    Total Protein 03/29/2022 7.1  6.0 - 8.4 g/dL Final    Albumin 03/29/2022 3.7  3.5 - 5.2 g/dL Final    Total Bilirubin 03/29/2022 0.5  0.1 - 1.0 mg/dL Final    Alkaline Phosphatase 03/29/2022 85  55 - 135 U/L Final    AST 03/29/2022 18  10 - 40 U/L Final    ALT 03/29/2022 16  10 - 44 U/L Final    Anion Gap 03/29/2022 8  8 - 16 mmol/L Final    eGFR if African American 03/29/2022 >60  >60 mL/min/1.73 m^2 Final    eGFR if non African American 03/29/2022 >60  >60 mL/min/1.73 m^2 Final   Patient Outreach on 11/17/2021   Component Date Value Ref Range Status    Cholesterol 07/06/2020 209 (A) 0 - 200  "mg/dL Final    TRIGLYCERIDE (LIPID PAN) 07/06/2020 102   Final    HDL 07/06/2020 54  mg/dL Final    LDL Calculated 07/06/2020 135  0 - 160 MG/DL Final    Hepatitis C Ab 05/24/2019 Negative   Final       No past medical history on file.  Social History     Socioeconomic History    Marital status:      No past surgical history on file.  No family history on file.    Review of patient's allergies indicates:   Allergen Reactions    Codeine Nausea And Vomiting       Current Outpatient Medications:     carbidopa-levodopa  mg (SINEMET)  mg per tablet, Take 1 tablet by mouth 3 (three) times daily., Disp: , Rfl:     melatonin 10 mg Tab, melatonin  1 hs, Disp: , Rfl:     dextromethorphan-guaiFENesin  mg (MUCINEX DM)  mg per 12 hr tablet, Take 1 tablet by mouth 2 (two) times daily. for 10 days, Disp: 20 tablet, Rfl: 99    nadoloL (CORGARD) 20 MG tablet, Take 1 tablet (20 mg total) by mouth once daily., Disp: 30 tablet, Rfl: 11    Review of Systems        Objective:      Vitals:    04/26/22 1415   BP: 110/70   Pulse: 77   Temp: 98.5 °F (36.9 °C)   SpO2: 97%   Weight: 71.7 kg (158 lb)   Height: 5' 2" (1.575 m)     Physical Exam  Vitals and nursing note reviewed.   Neck:      Vascular: No carotid bruit.   Cardiovascular:      Rate and Rhythm: Normal rate and regular rhythm.   Pulmonary:      Effort: Pulmonary effort is normal. No respiratory distress.      Breath sounds: Normal breath sounds.   Musculoskeletal:      Right lower leg: No edema.      Left lower leg: No edema.   Skin:     General: Skin is dry.   Neurological:      Mental Status: She is alert.           Assessment:       1. Parkinson disease    2. Cough         Plan:       Parkinson disease  -     nadoloL (CORGARD) 20 MG tablet; Take 1 tablet (20 mg total) by mouth once daily.  Dispense: 30 tablet; Refill: 11    Cough  -     dextromethorphan-guaiFENesin  mg (MUCINEX DM)  mg per 12 hr tablet; Take 1 tablet by mouth 2 " (two) times daily. for 10 days  Dispense: 20 tablet; Refill: 99      Follow up in about 6 weeks (around 6/7/2022).      Add new medicine to regimen once a day.  Take Mucinex DM twice a day with liquids for cough.    If develop purulence productive cough or fever call office.  4/26/2022 Travon Broussard M.D.

## 2022-05-05 ENCOUNTER — PATIENT MESSAGE (OUTPATIENT)
Dept: RESEARCH | Facility: HOSPITAL | Age: 67
End: 2022-05-05
Payer: MEDICARE

## 2022-06-06 ENCOUNTER — TELEPHONE (OUTPATIENT)
Dept: FAMILY MEDICINE | Facility: CLINIC | Age: 67
End: 2022-06-06
Payer: MEDICARE

## 2022-06-07 ENCOUNTER — OFFICE VISIT (OUTPATIENT)
Dept: FAMILY MEDICINE | Facility: CLINIC | Age: 67
End: 2022-06-07
Payer: MEDICARE

## 2022-06-07 VITALS
TEMPERATURE: 99 F | SYSTOLIC BLOOD PRESSURE: 124 MMHG | DIASTOLIC BLOOD PRESSURE: 80 MMHG | HEIGHT: 62 IN | HEART RATE: 75 BPM | BODY MASS INDEX: 28.89 KG/M2 | WEIGHT: 157 LBS | OXYGEN SATURATION: 95 %

## 2022-06-07 DIAGNOSIS — N32.81 OVERACTIVE BLADDER: ICD-10-CM

## 2022-06-07 DIAGNOSIS — G20.A1 PARKINSON DISEASE: Primary | ICD-10-CM

## 2022-06-07 PROCEDURE — 99999 PR PBB SHADOW E&M-EST. PATIENT-LVL III: CPT | Mod: PBBFAC,,, | Performed by: FAMILY MEDICINE

## 2022-06-07 PROCEDURE — 3079F PR MOST RECENT DIASTOLIC BLOOD PRESSURE 80-89 MM HG: ICD-10-PCS | Mod: CPTII,S$GLB,, | Performed by: FAMILY MEDICINE

## 2022-06-07 PROCEDURE — 3008F BODY MASS INDEX DOCD: CPT | Mod: CPTII,S$GLB,, | Performed by: FAMILY MEDICINE

## 2022-06-07 PROCEDURE — 1101F PT FALLS ASSESS-DOCD LE1/YR: CPT | Mod: CPTII,S$GLB,, | Performed by: FAMILY MEDICINE

## 2022-06-07 PROCEDURE — 1159F MED LIST DOCD IN RCRD: CPT | Mod: CPTII,S$GLB,, | Performed by: FAMILY MEDICINE

## 2022-06-07 PROCEDURE — 3288F PR FALLS RISK ASSESSMENT DOCUMENTED: ICD-10-PCS | Mod: CPTII,S$GLB,, | Performed by: FAMILY MEDICINE

## 2022-06-07 PROCEDURE — 3288F FALL RISK ASSESSMENT DOCD: CPT | Mod: CPTII,S$GLB,, | Performed by: FAMILY MEDICINE

## 2022-06-07 PROCEDURE — 1159F PR MEDICATION LIST DOCUMENTED IN MEDICAL RECORD: ICD-10-PCS | Mod: CPTII,S$GLB,, | Performed by: FAMILY MEDICINE

## 2022-06-07 PROCEDURE — 3008F PR BODY MASS INDEX (BMI) DOCUMENTED: ICD-10-PCS | Mod: CPTII,S$GLB,, | Performed by: FAMILY MEDICINE

## 2022-06-07 PROCEDURE — 99999 PR PBB SHADOW E&M-EST. PATIENT-LVL III: ICD-10-PCS | Mod: PBBFAC,,, | Performed by: FAMILY MEDICINE

## 2022-06-07 PROCEDURE — 99213 PR OFFICE/OUTPT VISIT, EST, LEVL III, 20-29 MIN: ICD-10-PCS | Mod: S$GLB,,, | Performed by: FAMILY MEDICINE

## 2022-06-07 PROCEDURE — 3079F DIAST BP 80-89 MM HG: CPT | Mod: CPTII,S$GLB,, | Performed by: FAMILY MEDICINE

## 2022-06-07 PROCEDURE — 3074F PR MOST RECENT SYSTOLIC BLOOD PRESSURE < 130 MM HG: ICD-10-PCS | Mod: CPTII,S$GLB,, | Performed by: FAMILY MEDICINE

## 2022-06-07 PROCEDURE — 1101F PR PT FALLS ASSESS DOC 0-1 FALLS W/OUT INJ PAST YR: ICD-10-PCS | Mod: CPTII,S$GLB,, | Performed by: FAMILY MEDICINE

## 2022-06-07 PROCEDURE — 99213 OFFICE O/P EST LOW 20 MIN: CPT | Mod: S$GLB,,, | Performed by: FAMILY MEDICINE

## 2022-06-07 PROCEDURE — 3074F SYST BP LT 130 MM HG: CPT | Mod: CPTII,S$GLB,, | Performed by: FAMILY MEDICINE

## 2022-06-07 RX ORDER — TOLTERODINE 2 MG/1
CAPSULE, EXTENDED RELEASE ORAL
Qty: 60 CAPSULE | Refills: 11 | Status: SHIPPED | OUTPATIENT
Start: 2022-06-07 | End: 2022-09-08 | Stop reason: SINTOL

## 2022-06-07 NOTE — PATIENT INSTRUCTIONS
Continue Sinemet 10/100 at 1/2 in the morning and 1 at bedtime for another 3 weeks.  Then try to increase to 1/2 in the morning, 1/2 in the afternoon, and 1 at bedtime.

## 2022-06-07 NOTE — PROGRESS NOTES
SUBJECTIVE:    Patient ID: Corinna Rene is a 67 y.o. female.    Chief Complaint: Follow-up (Pt here for f/u/Pt states she had to d/c Naldolol,made her blood pressure too low and weakness)    Follow-up on this 67-year-old female with Parkinson's.  She is on low-dose Sinemet and she has not tolerated higher doses well.  We added low-dose Naldolol at last visit.  She had no real response and she had significant side effects of lethargy.  Sleeps okay with melatonin at bedtime.            Lab Visit on 03/29/2022   Component Date Value Ref Range Status    Cholesterol 03/29/2022 196  120 - 199 mg/dL Final    Triglycerides 03/29/2022 79  30 - 150 mg/dL Final    HDL 03/29/2022 57  40 - 75 mg/dL Final    LDL Cholesterol 03/29/2022 123.2  63.0 - 159.0 mg/dL Final    HDL/Cholesterol Ratio 03/29/2022 29.1  20.0 - 50.0 % Final    Total Cholesterol/HDL Ratio 03/29/2022 3.4  2.0 - 5.0 Final    Non-HDL Cholesterol 03/29/2022 139  mg/dL Final    Sodium 03/29/2022 139  136 - 145 mmol/L Final    Potassium 03/29/2022 4.0  3.5 - 5.1 mmol/L Final    Chloride 03/29/2022 105  95 - 110 mmol/L Final    CO2 03/29/2022 26  23 - 29 mmol/L Final    Glucose 03/29/2022 96  70 - 110 mg/dL Final    BUN 03/29/2022 17  8 - 23 mg/dL Final    Creatinine 03/29/2022 0.7  0.5 - 1.4 mg/dL Final    Calcium 03/29/2022 9.0  8.7 - 10.5 mg/dL Final    Total Protein 03/29/2022 7.1  6.0 - 8.4 g/dL Final    Albumin 03/29/2022 3.7  3.5 - 5.2 g/dL Final    Total Bilirubin 03/29/2022 0.5  0.1 - 1.0 mg/dL Final    Alkaline Phosphatase 03/29/2022 85  55 - 135 U/L Final    AST 03/29/2022 18  10 - 40 U/L Final    ALT 03/29/2022 16  10 - 44 U/L Final    Anion Gap 03/29/2022 8  8 - 16 mmol/L Final    eGFR if African American 03/29/2022 >60  >60 mL/min/1.73 m^2 Final    eGFR if non African American 03/29/2022 >60  >60 mL/min/1.73 m^2 Final       No past medical history on file.  Social History     Socioeconomic History    Marital status:   "    No past surgical history on file.  No family history on file.    Review of patient's allergies indicates:   Allergen Reactions    Codeine Nausea And Vomiting       Current Outpatient Medications:     carbidopa-levodopa  mg (SINEMET)  mg per tablet, Take 1 tablet by mouth 3 (three) times daily., Disp: , Rfl:     melatonin 10 mg Tab, melatonin  1 hs, Disp: , Rfl:     tolterodine (DETROL LA) 2 MG Cp24, 1-2 pills daily, Disp: 60 capsule, Rfl: 11    Review of Systems   Respiratory: Negative.    Cardiovascular: Positive for palpitations (few episodes of SVPT).           Objective:      Vitals:    06/07/22 1338   BP: 124/80   Pulse: 75   Temp: 98.9 °F (37.2 °C)   SpO2: 95%   Weight: 71.2 kg (157 lb)   Height: 5' 2" (1.575 m)     Physical Exam  Constitutional:       Comments:       Heart is in regular rate and rhythm at time of exam.  No carotid bruits.  No pretibial edema at time of exam.             Assessment:       1. Parkinson disease    2. Overactive bladder         Plan:       Parkinson disease    Overactive bladder  -     tolterodine (DETROL LA) 2 MG Cp24; 1-2 pills daily  Dispense: 60 capsule; Refill: 11      Follow up in about 3 months (around 9/7/2022).      Continue Sinemet 10/100 at 1/2 in the morning and 1 at bedtime for another 3 weeks.  Then try to increase to 1/2 in the morning, 1/2 in the afternoon, and 1 at bedtime.  6/7/2022 Travon Broussard M.D.      "

## 2022-07-27 DIAGNOSIS — Z12.11 COLON CANCER SCREENING: ICD-10-CM

## 2022-08-01 ENCOUNTER — PATIENT MESSAGE (OUTPATIENT)
Dept: ADMINISTRATIVE | Facility: HOSPITAL | Age: 67
End: 2022-08-01
Payer: MEDICARE

## 2022-08-31 DIAGNOSIS — Z78.0 MENOPAUSE: ICD-10-CM

## 2022-09-08 ENCOUNTER — OFFICE VISIT (OUTPATIENT)
Dept: FAMILY MEDICINE | Facility: CLINIC | Age: 67
End: 2022-09-08
Payer: MEDICARE

## 2022-09-08 DIAGNOSIS — G20.A1 PARKINSON DISEASE: Primary | ICD-10-CM

## 2022-09-08 PROCEDURE — 1101F PT FALLS ASSESS-DOCD LE1/YR: CPT | Mod: CPTII,S$GLB,, | Performed by: FAMILY MEDICINE

## 2022-09-08 PROCEDURE — 3288F PR FALLS RISK ASSESSMENT DOCUMENTED: ICD-10-PCS | Mod: CPTII,S$GLB,, | Performed by: FAMILY MEDICINE

## 2022-09-08 PROCEDURE — 99213 PR OFFICE/OUTPT VISIT, EST, LEVL III, 20-29 MIN: ICD-10-PCS | Mod: S$GLB,,, | Performed by: FAMILY MEDICINE

## 2022-09-08 PROCEDURE — 99999 PR PBB SHADOW E&M-EST. PATIENT-LVL II: ICD-10-PCS | Mod: PBBFAC,,, | Performed by: FAMILY MEDICINE

## 2022-09-08 PROCEDURE — 3288F FALL RISK ASSESSMENT DOCD: CPT | Mod: CPTII,S$GLB,, | Performed by: FAMILY MEDICINE

## 2022-09-08 PROCEDURE — 1159F PR MEDICATION LIST DOCUMENTED IN MEDICAL RECORD: ICD-10-PCS | Mod: CPTII,S$GLB,, | Performed by: FAMILY MEDICINE

## 2022-09-08 PROCEDURE — 99999 PR PBB SHADOW E&M-EST. PATIENT-LVL II: CPT | Mod: PBBFAC,,, | Performed by: FAMILY MEDICINE

## 2022-09-08 PROCEDURE — 1101F PR PT FALLS ASSESS DOC 0-1 FALLS W/OUT INJ PAST YR: ICD-10-PCS | Mod: CPTII,S$GLB,, | Performed by: FAMILY MEDICINE

## 2022-09-08 PROCEDURE — 1159F MED LIST DOCD IN RCRD: CPT | Mod: CPTII,S$GLB,, | Performed by: FAMILY MEDICINE

## 2022-09-08 PROCEDURE — 99213 OFFICE O/P EST LOW 20 MIN: CPT | Mod: S$GLB,,, | Performed by: FAMILY MEDICINE

## 2022-09-08 RX ORDER — NADOLOL 20 MG/1
20 TABLET ORAL NIGHTLY
Qty: 30 TABLET | Refills: 11 | Status: SHIPPED | OUTPATIENT
Start: 2022-09-08 | End: 2023-09-08

## 2022-09-08 RX ORDER — CARBIDOPA AND LEVODOPA 10; 100 MG/1; MG/1
1 TABLET ORAL 3 TIMES DAILY
Qty: 90 TABLET | Refills: 3 | Status: SHIPPED | OUTPATIENT
Start: 2022-09-08 | End: 2023-04-28

## 2022-09-08 NOTE — PROGRESS NOTES
SUBJECTIVE:    Patient ID: Corinna Rene is a 67 y.o. female.    Chief Complaint: Follow-up (Pt here for 3 month ck up Parkinsons/Pt states she didn't know she had a new rx,Naldolol,she didn't /Pt states detrol caused bad stomach cramps she d/c)    Follow-up on this 67-year-old female Parkinson's.    She her Parkinson's is increasing in severity.    She is unable to afford more expensive medications for Parkinson's.    She is back on carbidopa 11 dopa 10 100.    She is only able to take 2 a day due to side effects of sedation.    We had called in a prescription Corgard as an add on however she never obtained this due to miscommunication that it was at the pharmacy.    She has apparently lost her taste.    She has no other GI symptoms.    She has a 6 lb weight loss due to decreased p.o. intake.    Lab work  showed a normal CMP and a low risk lipid profile.  She discontinue Detrol due to side effects of abdominal cramping.            Lab Visit on 03/29/2022   Component Date Value Ref Range Status    Cholesterol 03/29/2022 196  120 - 199 mg/dL Final    Triglycerides 03/29/2022 79  30 - 150 mg/dL Final    HDL 03/29/2022 57  40 - 75 mg/dL Final    LDL Cholesterol 03/29/2022 123.2  63.0 - 159.0 mg/dL Final    HDL/Cholesterol Ratio 03/29/2022 29.1  20.0 - 50.0 % Final    Total Cholesterol/HDL Ratio 03/29/2022 3.4  2.0 - 5.0 Final    Non-HDL Cholesterol 03/29/2022 139  mg/dL Final    Sodium 03/29/2022 139  136 - 145 mmol/L Final    Potassium 03/29/2022 4.0  3.5 - 5.1 mmol/L Final    Chloride 03/29/2022 105  95 - 110 mmol/L Final    CO2 03/29/2022 26  23 - 29 mmol/L Final    Glucose 03/29/2022 96  70 - 110 mg/dL Final    BUN 03/29/2022 17  8 - 23 mg/dL Final    Creatinine 03/29/2022 0.7  0.5 - 1.4 mg/dL Final    Calcium 03/29/2022 9.0  8.7 - 10.5 mg/dL Final    Total Protein 03/29/2022 7.1  6.0 - 8.4 g/dL Final    Albumin 03/29/2022 3.7  3.5 - 5.2 g/dL Final    Total Bilirubin 03/29/2022 0.5  0.1 - 1.0 mg/dL Final     Alkaline Phosphatase 03/29/2022 85  55 - 135 U/L Final    AST 03/29/2022 18  10 - 40 U/L Final    ALT 03/29/2022 16  10 - 44 U/L Final    Anion Gap 03/29/2022 8  8 - 16 mmol/L Final    eGFR if African American 03/29/2022 >60  >60 mL/min/1.73 m^2 Final    eGFR if non African American 03/29/2022 >60  >60 mL/min/1.73 m^2 Final       No past medical history on file.  Social History     Socioeconomic History    Marital status:      No past surgical history on file.  No family history on file.    Review of patient's allergies indicates:   Allergen Reactions    Detrol [tolterodine] Other (See Comments)     Stomach cramps    Codeine Nausea And Vomiting       Current Outpatient Medications:     melatonin 10 mg Tab, melatonin  1 hs, Disp: , Rfl:     carbidopa-levodopa  mg (SINEMET)  mg per tablet, Take 1 tablet by mouth 3 (three) times daily., Disp: 90 tablet, Rfl: 3    nadoloL (CORGARD) 20 MG tablet, Take 1 tablet (20 mg total) by mouth every evening., Disp: 30 tablet, Rfl: 11    Review of Systems   Constitutional:               As in HPI         Objective:      There were no vitals filed for this visit.  Physical Exam  Constitutional:       Comments:       Heart is in regular rate and rhythm at time of exam.  No carotid bruits.  No pretibial edema at time of exam.           Assessment:       1. Parkinson disease           Plan:       Parkinson disease  -     nadoloL (CORGARD) 20 MG tablet; Take 1 tablet (20 mg total) by mouth every evening.  Dispense: 30 tablet; Refill: 11  -     carbidopa-levodopa  mg (SINEMET)  mg per tablet; Take 1 tablet by mouth 3 (three) times daily.  Dispense: 90 tablet; Refill: 3    Follow up in about 4 weeks (around 10/6/2022).        Add Corgard 20 mg a day  9/8/2022 Travon Broussard M.D.

## 2022-10-06 ENCOUNTER — OFFICE VISIT (OUTPATIENT)
Dept: FAMILY MEDICINE | Facility: CLINIC | Age: 67
End: 2022-10-06
Payer: MEDICARE

## 2022-10-06 VITALS
TEMPERATURE: 98 F | SYSTOLIC BLOOD PRESSURE: 102 MMHG | HEART RATE: 61 BPM | HEIGHT: 62 IN | DIASTOLIC BLOOD PRESSURE: 80 MMHG | OXYGEN SATURATION: 95 % | BODY MASS INDEX: 27.79 KG/M2 | WEIGHT: 151 LBS

## 2022-10-06 DIAGNOSIS — G20.A1 PARKINSON DISEASE: Primary | ICD-10-CM

## 2022-10-06 DIAGNOSIS — Z23 NEEDS FLU SHOT: ICD-10-CM

## 2022-10-06 DIAGNOSIS — I05.9 MITRAL VALVE DISORDER: ICD-10-CM

## 2022-10-06 DIAGNOSIS — I47.10 SUPRAVENTRICULAR TACHYCARDIA: ICD-10-CM

## 2022-10-06 PROCEDURE — 99999 PR PBB SHADOW E&M-EST. PATIENT-LVL III: CPT | Mod: PBBFAC,,, | Performed by: FAMILY MEDICINE

## 2022-10-06 PROCEDURE — 1126F PR PAIN SEVERITY QUANTIFIED, NO PAIN PRESENT: ICD-10-PCS | Mod: CPTII,S$GLB,, | Performed by: FAMILY MEDICINE

## 2022-10-06 PROCEDURE — 99213 PR OFFICE/OUTPT VISIT, EST, LEVL III, 20-29 MIN: ICD-10-PCS | Mod: S$GLB,,, | Performed by: FAMILY MEDICINE

## 2022-10-06 PROCEDURE — 3079F DIAST BP 80-89 MM HG: CPT | Mod: CPTII,S$GLB,, | Performed by: FAMILY MEDICINE

## 2022-10-06 PROCEDURE — 99213 OFFICE O/P EST LOW 20 MIN: CPT | Mod: S$GLB,,, | Performed by: FAMILY MEDICINE

## 2022-10-06 PROCEDURE — 3288F PR FALLS RISK ASSESSMENT DOCUMENTED: ICD-10-PCS | Mod: CPTII,S$GLB,, | Performed by: FAMILY MEDICINE

## 2022-10-06 PROCEDURE — 3008F BODY MASS INDEX DOCD: CPT | Mod: CPTII,S$GLB,, | Performed by: FAMILY MEDICINE

## 2022-10-06 PROCEDURE — 99999 PR PBB SHADOW E&M-EST. PATIENT-LVL III: ICD-10-PCS | Mod: PBBFAC,,, | Performed by: FAMILY MEDICINE

## 2022-10-06 PROCEDURE — 3288F FALL RISK ASSESSMENT DOCD: CPT | Mod: CPTII,S$GLB,, | Performed by: FAMILY MEDICINE

## 2022-10-06 PROCEDURE — 1101F PR PT FALLS ASSESS DOC 0-1 FALLS W/OUT INJ PAST YR: ICD-10-PCS | Mod: CPTII,S$GLB,, | Performed by: FAMILY MEDICINE

## 2022-10-06 PROCEDURE — 3008F PR BODY MASS INDEX (BMI) DOCUMENTED: ICD-10-PCS | Mod: CPTII,S$GLB,, | Performed by: FAMILY MEDICINE

## 2022-10-06 PROCEDURE — 3074F SYST BP LT 130 MM HG: CPT | Mod: CPTII,S$GLB,, | Performed by: FAMILY MEDICINE

## 2022-10-06 PROCEDURE — 3079F PR MOST RECENT DIASTOLIC BLOOD PRESSURE 80-89 MM HG: ICD-10-PCS | Mod: CPTII,S$GLB,, | Performed by: FAMILY MEDICINE

## 2022-10-06 PROCEDURE — 1126F AMNT PAIN NOTED NONE PRSNT: CPT | Mod: CPTII,S$GLB,, | Performed by: FAMILY MEDICINE

## 2022-10-06 PROCEDURE — 1101F PT FALLS ASSESS-DOCD LE1/YR: CPT | Mod: CPTII,S$GLB,, | Performed by: FAMILY MEDICINE

## 2022-10-06 PROCEDURE — 3074F PR MOST RECENT SYSTOLIC BLOOD PRESSURE < 130 MM HG: ICD-10-PCS | Mod: CPTII,S$GLB,, | Performed by: FAMILY MEDICINE

## 2022-10-06 NOTE — PROGRESS NOTES
SUBJECTIVE:    Patient ID: Corinna Rene is a 67 y.o. female.    Chief Complaint: Follow-up (F/u new medicine naldolol)    67-year-old female follow-up on tremors and palpitations.    Retrial of naldolol started 1 month ago.    She averages #1 -20 mg tablet a day taken in divided doses.    She is tolerating this medication well.    She has had no orthostatic hypotension symptoms.    She has had a positive response to both palpitations and her tremor.    She has lost a total of 14 lb in the last 18 months.    Her weight loss is due to decreased appetite and better eating habits.  Her decreased appetite is secondary to decreased taste.          No visits with results within 6 Month(s) from this visit.   Latest known visit with results is:   Lab Visit on 03/29/2022   Component Date Value Ref Range Status    Cholesterol 03/29/2022 196  120 - 199 mg/dL Final    Triglycerides 03/29/2022 79  30 - 150 mg/dL Final    HDL 03/29/2022 57  40 - 75 mg/dL Final    LDL Cholesterol 03/29/2022 123.2  63.0 - 159.0 mg/dL Final    HDL/Cholesterol Ratio 03/29/2022 29.1  20.0 - 50.0 % Final    Total Cholesterol/HDL Ratio 03/29/2022 3.4  2.0 - 5.0 Final    Non-HDL Cholesterol 03/29/2022 139  mg/dL Final    Sodium 03/29/2022 139  136 - 145 mmol/L Final    Potassium 03/29/2022 4.0  3.5 - 5.1 mmol/L Final    Chloride 03/29/2022 105  95 - 110 mmol/L Final    CO2 03/29/2022 26  23 - 29 mmol/L Final    Glucose 03/29/2022 96  70 - 110 mg/dL Final    BUN 03/29/2022 17  8 - 23 mg/dL Final    Creatinine 03/29/2022 0.7  0.5 - 1.4 mg/dL Final    Calcium 03/29/2022 9.0  8.7 - 10.5 mg/dL Final    Total Protein 03/29/2022 7.1  6.0 - 8.4 g/dL Final    Albumin 03/29/2022 3.7  3.5 - 5.2 g/dL Final    Total Bilirubin 03/29/2022 0.5  0.1 - 1.0 mg/dL Final    Alkaline Phosphatase 03/29/2022 85  55 - 135 U/L Final    AST 03/29/2022 18  10 - 40 U/L Final    ALT 03/29/2022 16  10 - 44 U/L Final    Anion Gap 03/29/2022 8  8 - 16 mmol/L Final    eGFR if African  "American 03/29/2022 >60  >60 mL/min/1.73 m^2 Final    eGFR if non African American 03/29/2022 >60  >60 mL/min/1.73 m^2 Final       No past medical history on file.  Social History     Socioeconomic History    Marital status:      No past surgical history on file.  No family history on file.    Review of patient's allergies indicates:   Allergen Reactions    Detrol [tolterodine] Other (See Comments)     Stomach cramps    Codeine Nausea And Vomiting       Current Outpatient Medications:     carbidopa-levodopa  mg (SINEMET)  mg per tablet, Take 1 tablet by mouth 3 (three) times daily., Disp: 90 tablet, Rfl: 3    melatonin 10 mg Tab, melatonin  1 hs, Disp: , Rfl:     nadoloL (CORGARD) 20 MG tablet, Take 1 tablet (20 mg total) by mouth every evening., Disp: 30 tablet, Rfl: 11    Review of Systems   Constitutional:               No palpitations on medications.    Dysphagia has resolved.    No orthostasis.    No pretibial edema.             Objective:      Vitals:    10/06/22 1353   BP: 102/80   Pulse: 61   Temp: 98.3 °F (36.8 °C)   SpO2: 95%   Weight: 68.5 kg (151 lb)   Height: 5' 2" (1.575 m)     Physical Exam  Constitutional:       Comments:       Heart is in regular rate and rhythm at time of exam.  No carotid bruits.  No pretibial edema at time of exam.  Minimal tremor noted today.           Assessment:       1. Parkinson disease    2. Mitral valve disorder    3. Supraventricular tachycardia         Plan:       Parkinson disease    Mitral valve disorder    Supraventricular tachycardia    Follow up in about 6 months (around 4/6/2023).      Continue current medication regimen.      10/6/2022 Travon Brousasrd M.D.        "

## 2022-10-11 ENCOUNTER — CLINICAL SUPPORT (OUTPATIENT)
Dept: FAMILY MEDICINE | Facility: CLINIC | Age: 67
End: 2022-10-11
Payer: MEDICARE

## 2022-10-11 PROCEDURE — 90694 FLU VACCINE - QUADRIVALENT - ADJUVANTED: ICD-10-PCS | Mod: S$GLB,,,

## 2022-10-11 PROCEDURE — G0008 ADMIN INFLUENZA VIRUS VAC: HCPCS | Mod: S$GLB,,,

## 2022-10-11 PROCEDURE — 90694 VACC AIIV4 NO PRSRV 0.5ML IM: CPT | Mod: S$GLB,,,

## 2022-10-11 PROCEDURE — G0008 FLU VACCINE - QUADRIVALENT - ADJUVANTED: ICD-10-PCS | Mod: S$GLB,,,

## 2022-11-30 ENCOUNTER — PATIENT MESSAGE (OUTPATIENT)
Dept: FAMILY MEDICINE | Facility: CLINIC | Age: 67
End: 2022-11-30
Payer: MEDICARE

## 2022-12-08 ENCOUNTER — OFFICE VISIT (OUTPATIENT)
Dept: FAMILY MEDICINE | Facility: CLINIC | Age: 67
End: 2022-12-08
Payer: MEDICARE

## 2022-12-08 VITALS
BODY MASS INDEX: 27.54 KG/M2 | DIASTOLIC BLOOD PRESSURE: 70 MMHG | TEMPERATURE: 99 F | HEART RATE: 74 BPM | OXYGEN SATURATION: 96 % | SYSTOLIC BLOOD PRESSURE: 112 MMHG | WEIGHT: 150.56 LBS

## 2022-12-08 DIAGNOSIS — U07.1 COVID-19: ICD-10-CM

## 2022-12-08 DIAGNOSIS — R09.89 UPPER RESPIRATORY SYMPTOM: Primary | ICD-10-CM

## 2022-12-08 LAB
CTP QC/QA: YES
CTP QC/QA: YES
POC MOLECULAR INFLUENZA A AGN: NEGATIVE
POC MOLECULAR INFLUENZA B AGN: NEGATIVE
SARS-COV-2 RDRP RESP QL NAA+PROBE: POSITIVE

## 2022-12-08 PROCEDURE — 1159F MED LIST DOCD IN RCRD: CPT | Mod: CPTII,,, | Performed by: NURSE PRACTITIONER

## 2022-12-08 PROCEDURE — 3008F PR BODY MASS INDEX (BMI) DOCUMENTED: ICD-10-PCS | Mod: CPTII,,, | Performed by: NURSE PRACTITIONER

## 2022-12-08 PROCEDURE — 87635 SARS-COV-2 COVID-19 AMP PRB: CPT | Mod: QW,,, | Performed by: NURSE PRACTITIONER

## 2022-12-08 PROCEDURE — 3074F PR MOST RECENT SYSTOLIC BLOOD PRESSURE < 130 MM HG: ICD-10-PCS | Mod: CPTII,,, | Performed by: NURSE PRACTITIONER

## 2022-12-08 PROCEDURE — 87635: ICD-10-PCS | Mod: QW,,, | Performed by: NURSE PRACTITIONER

## 2022-12-08 PROCEDURE — 99213 PR OFFICE/OUTPT VISIT, EST, LEVL III, 20-29 MIN: ICD-10-PCS | Mod: ,,, | Performed by: NURSE PRACTITIONER

## 2022-12-08 PROCEDURE — 1159F PR MEDICATION LIST DOCUMENTED IN MEDICAL RECORD: ICD-10-PCS | Mod: CPTII,,, | Performed by: NURSE PRACTITIONER

## 2022-12-08 PROCEDURE — 3008F BODY MASS INDEX DOCD: CPT | Mod: CPTII,,, | Performed by: NURSE PRACTITIONER

## 2022-12-08 PROCEDURE — 87502 INFLUENZA DNA AMP PROBE: CPT | Mod: QW,,, | Performed by: NURSE PRACTITIONER

## 2022-12-08 PROCEDURE — 99213 OFFICE O/P EST LOW 20 MIN: CPT | Mod: ,,, | Performed by: NURSE PRACTITIONER

## 2022-12-08 PROCEDURE — 3074F SYST BP LT 130 MM HG: CPT | Mod: CPTII,,, | Performed by: NURSE PRACTITIONER

## 2022-12-08 PROCEDURE — 3078F PR MOST RECENT DIASTOLIC BLOOD PRESSURE < 80 MM HG: ICD-10-PCS | Mod: CPTII,,, | Performed by: NURSE PRACTITIONER

## 2022-12-08 PROCEDURE — 3078F DIAST BP <80 MM HG: CPT | Mod: CPTII,,, | Performed by: NURSE PRACTITIONER

## 2022-12-08 PROCEDURE — 87502 POCT INFLUENZA A/B MOLECULAR: ICD-10-PCS | Mod: QW,,, | Performed by: NURSE PRACTITIONER

## 2022-12-08 NOTE — PROGRESS NOTES
Subjective:       Patient ID: Corinna Rene is a 67 y.o. female.    Chief Complaint: Cough (Cough x 2 days/Sneezing and runny nose x 1 day) and Sinus Problem    HPI  Review of Systems   HENT:  Positive for congestion, rhinorrhea, sneezing and voice change.    Respiratory:  Positive for cough. Negative for chest tightness, shortness of breath and wheezing.    Cardiovascular:  Negative for chest pain, palpitations and leg swelling.   Gastrointestinal:  Negative for diarrhea, nausea and vomiting.   Neurological:  Negative for dizziness and headaches.     Patient Active Problem List   Diagnosis    Basal cell carcinoma of skin    Mitral valve disorder    Retinal tear    Diastolic dysfunction    Parkinson disease       Objective:      Physical Exam  Constitutional:       General: She is not in acute distress.     Appearance: Normal appearance.   HENT:      Right Ear: Tympanic membrane and ear canal normal.      Left Ear: Tympanic membrane and ear canal normal.      Nose: Rhinorrhea present. Rhinorrhea is clear.      Mouth/Throat:      Mouth: Mucous membranes are moist.      Pharynx: Oropharynx is clear. No pharyngeal swelling or posterior oropharyngeal erythema.   Eyes:      General: Lids are normal.      Conjunctiva/sclera: Conjunctivae normal.   Cardiovascular:      Rate and Rhythm: Normal rate and regular rhythm.      Heart sounds: Normal heart sounds.   Pulmonary:      Effort: Pulmonary effort is normal. No respiratory distress.      Breath sounds: Normal breath sounds. No wheezing.   Neurological:      Mental Status: She is alert.       Lab Results   Component Value Date    WBC 7.83 04/29/2021    HGB 13.2 04/29/2021    HCT 40.6 04/29/2021     04/29/2021    CHOL 196 03/29/2022    TRIG 79 03/29/2022    HDL 57 03/29/2022    ALT 16 03/29/2022    AST 18 03/29/2022     03/29/2022    K 4.0 03/29/2022     03/29/2022    CREATININE 0.7 03/29/2022    BUN 17 03/29/2022    CO2 26 03/29/2022    TSH 1.118  04/29/2021     The ASCVD Risk score (Ce SYKES, et al., 2019) failed to calculate for the following reasons:    The smoking status is invalid  Visit Vitals  /70 (BP Location: Left arm, Patient Position: Sitting, BP Method: Medium (Manual))   Pulse 74   Temp 98.5 °F (36.9 °C) (Oral)   Wt 68.3 kg (150 lb 9.2 oz)   SpO2 96%   BMI 27.54 kg/m²      Assessment:       1. Upper respiratory symptom    2. COVID-19        Plan:       1. Upper respiratory symptom  -     POCT COVID-19 Rapid Screening; Future; Expected date: 12/08/2022  -     POCT Influenza A/B Molecular    2. COVID-19    Based on CDC guidelines, the patient has been instructed to stay home due to   this illness. The patient can end isolation after 5 full days if you are   fever-free for 24 hours (without the use of fever-reducing medication) and   symptoms are improving. Continue to wear a mask through 10 days after onset of   symptoms.   Saline nose spray can be used for symptomatic   relief of nasal congestion. Tylenol (acetaminophen) up to 3000 mg per day, can   be taken for additional pain relief. Ibuprofen ( Advil, Motrin) up to 2400 mg   per day can be taken as well for fever or pain relief, but can be harder on the   stomach and can raise blood pressure.  Add vitamin D 3856-6195 units daily.   Drink plenty of water and hydrating beverages. Honey, either alone or in tea has   been shown to be an effective cough suppressant.  Running a vaporizer or   humidifier in bedroom at night can provide additional symptomatic relief.    Taking a hot, steamy shower or sitting in the steamy bathroom for 15 minutes can   also improve symptoms.     No follow-ups on file.

## 2023-04-28 ENCOUNTER — OFFICE VISIT (OUTPATIENT)
Dept: FAMILY MEDICINE | Facility: CLINIC | Age: 68
End: 2023-04-28
Payer: MEDICARE

## 2023-04-28 VITALS
DIASTOLIC BLOOD PRESSURE: 72 MMHG | HEART RATE: 72 BPM | BODY MASS INDEX: 26.79 KG/M2 | TEMPERATURE: 98 F | SYSTOLIC BLOOD PRESSURE: 114 MMHG | OXYGEN SATURATION: 98 % | WEIGHT: 146.5 LBS

## 2023-04-28 DIAGNOSIS — E66.3 OVERWEIGHT (BMI 25.0-29.9): ICD-10-CM

## 2023-04-28 DIAGNOSIS — G20.A1 PARKINSON DISEASE: Primary | ICD-10-CM

## 2023-04-28 PROCEDURE — 99214 PR OFFICE/OUTPT VISIT, EST, LEVL IV, 30-39 MIN: ICD-10-PCS | Mod: ,,, | Performed by: FAMILY MEDICINE

## 2023-04-28 PROCEDURE — 3008F BODY MASS INDEX DOCD: CPT | Mod: CPTII,,, | Performed by: FAMILY MEDICINE

## 2023-04-28 PROCEDURE — 1101F PT FALLS ASSESS-DOCD LE1/YR: CPT | Mod: CPTII,,, | Performed by: FAMILY MEDICINE

## 2023-04-28 PROCEDURE — 3078F DIAST BP <80 MM HG: CPT | Mod: CPTII,,, | Performed by: FAMILY MEDICINE

## 2023-04-28 PROCEDURE — 1160F RVW MEDS BY RX/DR IN RCRD: CPT | Mod: CPTII,,, | Performed by: FAMILY MEDICINE

## 2023-04-28 PROCEDURE — 3288F FALL RISK ASSESSMENT DOCD: CPT | Mod: CPTII,,, | Performed by: FAMILY MEDICINE

## 2023-04-28 PROCEDURE — 1126F PR PAIN SEVERITY QUANTIFIED, NO PAIN PRESENT: ICD-10-PCS | Mod: CPTII,,, | Performed by: FAMILY MEDICINE

## 2023-04-28 PROCEDURE — 3074F SYST BP LT 130 MM HG: CPT | Mod: CPTII,,, | Performed by: FAMILY MEDICINE

## 2023-04-28 PROCEDURE — 1160F PR REVIEW ALL MEDS BY PRESCRIBER/CLIN PHARMACIST DOCUMENTED: ICD-10-PCS | Mod: CPTII,,, | Performed by: FAMILY MEDICINE

## 2023-04-28 PROCEDURE — 99214 OFFICE O/P EST MOD 30 MIN: CPT | Mod: ,,, | Performed by: FAMILY MEDICINE

## 2023-04-28 PROCEDURE — 1159F MED LIST DOCD IN RCRD: CPT | Mod: CPTII,,, | Performed by: FAMILY MEDICINE

## 2023-04-28 PROCEDURE — 1101F PR PT FALLS ASSESS DOC 0-1 FALLS W/OUT INJ PAST YR: ICD-10-PCS | Mod: CPTII,,, | Performed by: FAMILY MEDICINE

## 2023-04-28 PROCEDURE — 3288F PR FALLS RISK ASSESSMENT DOCUMENTED: ICD-10-PCS | Mod: CPTII,,, | Performed by: FAMILY MEDICINE

## 2023-04-28 PROCEDURE — 3078F PR MOST RECENT DIASTOLIC BLOOD PRESSURE < 80 MM HG: ICD-10-PCS | Mod: CPTII,,, | Performed by: FAMILY MEDICINE

## 2023-04-28 PROCEDURE — 1159F PR MEDICATION LIST DOCUMENTED IN MEDICAL RECORD: ICD-10-PCS | Mod: CPTII,,, | Performed by: FAMILY MEDICINE

## 2023-04-28 PROCEDURE — 1126F AMNT PAIN NOTED NONE PRSNT: CPT | Mod: CPTII,,, | Performed by: FAMILY MEDICINE

## 2023-04-28 PROCEDURE — 3008F PR BODY MASS INDEX (BMI) DOCUMENTED: ICD-10-PCS | Mod: CPTII,,, | Performed by: FAMILY MEDICINE

## 2023-04-28 PROCEDURE — 3074F PR MOST RECENT SYSTOLIC BLOOD PRESSURE < 130 MM HG: ICD-10-PCS | Mod: CPTII,,, | Performed by: FAMILY MEDICINE

## 2023-04-28 RX ORDER — CARBIDOPA AND LEVODOPA 10; 100 MG/1; MG/1
1 TABLET ORAL 3 TIMES DAILY
Qty: 90 TABLET | Refills: 0 | Status: SHIPPED | OUTPATIENT
Start: 2023-04-28 | End: 2023-05-28

## 2023-04-28 RX ORDER — CARBIDOPA AND LEVODOPA 25; 100 MG/1; MG/1
1 TABLET ORAL 3 TIMES DAILY
COMMUNITY
End: 2023-04-28 | Stop reason: ALTCHOICE

## 2023-04-28 RX ORDER — CARBIDOPA AND LEVODOPA 25; 100 MG/1; MG/1
1 TABLET ORAL 3 TIMES DAILY
Qty: 90 TABLET | Refills: 0 | Status: CANCELLED | OUTPATIENT
Start: 2023-04-28 | End: 2023-05-28

## 2023-04-28 NOTE — PROGRESS NOTES
Subjective:       Patient ID: Corinna Rene is a 68 y.o. female.    Chief Complaint: Establish Care and Tremors (Patient reports she historically was under the care of Dr. Julius Nash with neurology, who placed the patient on carbidopa-levodopa 25-100mg tablets. Patient felt that the strength was too strong for her and Dr. Broussard decreased down to carbidopa-levodopa 10-100mg tablets. Patient hasn't been back to Dr. Nash for insurance reasons. Patient reports for approximately the last month she has accidentally been taking the previous 25-100mg strength that were from Dr. Nash and is now out. )    This patient is new to me.  Corinna Rene presents to the clinic today with complaints of felling not well since yesterday.   Patient reports she previously was under the care of Dr. Julius Nash with neurology, who placed the patient on carbidopa-levodopa 25-100mg tablets. Patient felt that the strength was too strong for her and Dr. Broussard decreased down to carbidopa-levodopa 10-100mg tablets. Patient hasn't been back to Dr. Nash for insurance reasons and needs a referral to a new neurologist today. Patient reports for approximately the last month she has accidentally been taking the previous 25-100mg strength that were from Dr. Nash and is now out. Patient reports she is much more emotional and tearful on the 25-100mg strength which is why Dr Broussard put her down to the 10-100mg dosage. Patient is open to seeing a new neurologist in the area for this but wanted to be sure someone knew what she had mistakenly done.   Patient educated on plan of care, verbalized understanding.       Review of Systems   Constitutional:  Negative for activity change, appetite change, chills, diaphoresis and fever.   HENT:  Negative for congestion, ear pain, postnasal drip, sinus pressure, sneezing and sore throat.    Eyes:  Negative for pain, discharge, redness and itching.   Respiratory:  Negative for apnea,  cough, chest tightness, shortness of breath and wheezing.    Cardiovascular:  Negative for chest pain and leg swelling.   Gastrointestinal:  Negative for abdominal distention, abdominal pain, constipation, diarrhea, nausea and vomiting.   Genitourinary:  Negative for difficulty urinating, dysuria, flank pain and frequency.   Skin:  Negative for color change, rash and wound.   Neurological:  Positive for tremors. Negative for dizziness.     Patient Active Problem List   Diagnosis    Basal cell carcinoma of skin    Mitral valve disorder    Retinal tear    Diastolic dysfunction    Parkinson disease       Objective:      Physical Exam  Vitals reviewed.   Constitutional:       General: She is not in acute distress.     Appearance: Normal appearance. She is well-developed.   HENT:      Head: Normocephalic.      Nose: Nose normal.   Eyes:      Conjunctiva/sclera: Conjunctivae normal.      Pupils: Pupils are equal, round, and reactive to light.   Cardiovascular:      Rate and Rhythm: Normal rate and regular rhythm.      Heart sounds: Normal heart sounds.   Pulmonary:      Effort: Pulmonary effort is normal. No respiratory distress.      Breath sounds: Normal breath sounds.   Musculoskeletal:      Cervical back: Normal range of motion and neck supple.   Skin:     General: Skin is warm and dry.      Findings: No rash.   Neurological:      Mental Status: She is alert and oriented to person, place, and time.      Motor: Tremor present.   Psychiatric:         Mood and Affect: Affect is tearful.         Behavior: Behavior normal.       Lab Results   Component Value Date    WBC 7.83 04/29/2021    HGB 13.2 04/29/2021    HCT 40.6 04/29/2021     04/29/2021    CHOL 196 03/29/2022    TRIG 79 03/29/2022    HDL 57 03/29/2022    ALT 16 03/29/2022    AST 18 03/29/2022     03/29/2022    K 4.0 03/29/2022     03/29/2022    CREATININE 0.7 03/29/2022    BUN 17 03/29/2022    CO2 26 03/29/2022    TSH 1.118 04/29/2021     The  10-year ASCVD risk score (Ce SYKES, et al., 2019) is: 6.8%    Values used to calculate the score:      Age: 68 years      Sex: Female      Is Non- : No      Diabetic: No      Tobacco smoker: No      Systolic Blood Pressure: 114 mmHg      Is BP treated: No      HDL Cholesterol: 57 mg/dL      Total Cholesterol: 196 mg/dL  Visit Vitals  /72 (BP Location: Left arm, Patient Position: Sitting, BP Method: Large (Manual))   Pulse 72   Temp 98.2 °F (36.8 °C) (Oral)   Wt 66.4 kg (146 lb 7.9 oz)   SpO2 98%   BMI 26.79 kg/m²      Assessment:       1. Parkinson disease    2. Overweight (BMI 25.0-29.9)        Plan:       Corinna was seen today for establish care and tremors.    Diagnoses and all orders for this visit:    Parkinson disease  -     Ambulatory referral/consult to Neurology; Future  -     carbidopa-levodopa  mg (SINEMET)  mg per tablet; Take 1 tablet by mouth 3 (three) times daily.  Continue medications as prescribed.  Follow up with PCP and neurology    Overweight (BMI 25.0-29.9)  Body mass index is 26.79 kg/m².  Continue healthy diet and regular exercise as tolerated.  Continue medications as prescribed.  Follow up with PCP        Follow up in about 3 months (around 7/28/2023), or if symptoms worsen or fail to improve.      Future Appointments       Date Provider Specialty Appt Notes    5/10/2023 Jhonny Clements MD             In excessive of 30 minutes total time spent for evaluation and management services. Time included elements of the following: time spent preparing to see patient, obtaining and reviewing separately obtained history, exam, evaluation, counseling and education of patient/family member or care giver, documenting in the HMR, independently interpreting results and communication of results, coordination of care ordering medications, tests, or procedures, referral and communication to other health care professionals.

## 2024-03-15 NOTE — HIM RECORD RETIREMENT NOTE
custodial of Incomplete Medical Record    3/15/24    Patient Name: Corinna Rene  Contact Serial # (CSN): [id number]  Patient Medical Record # (MRN): [id number]  Date of Service: Orders Only on 8/31/2022  Physician Name: Travon Broussard,    This record has been reviewed and is being retired as incomplete by the approval of the  Medical Staff Operating Committee (MSOC)     On 12/7/2022., due to:  Unavailability of Provider     Missing Information/Comments:  []    Discharge Summary   []    DC Note/Short Stay Summary   []    ED Provider Note   []    Delivery Note   []    History & Physical   []   Operative Note   []     Procedure Note   []     Physician Order   []     Verbal Order   [x]       Other, specify: Cosign Orders